# Patient Record
Sex: FEMALE | Race: BLACK OR AFRICAN AMERICAN
[De-identification: names, ages, dates, MRNs, and addresses within clinical notes are randomized per-mention and may not be internally consistent; named-entity substitution may affect disease eponyms.]

---

## 2018-03-03 ENCOUNTER — HOSPITAL ENCOUNTER (EMERGENCY)
Dept: HOSPITAL 72 - EMR | Age: 28
Discharge: HOME | End: 2018-03-03
Payer: COMMERCIAL

## 2018-03-03 VITALS — SYSTOLIC BLOOD PRESSURE: 122 MMHG | DIASTOLIC BLOOD PRESSURE: 78 MMHG

## 2018-03-03 VITALS — DIASTOLIC BLOOD PRESSURE: 78 MMHG | SYSTOLIC BLOOD PRESSURE: 122 MMHG

## 2018-03-03 VITALS — HEIGHT: 68 IN | WEIGHT: 180 LBS | BODY MASS INDEX: 27.28 KG/M2

## 2018-03-03 DIAGNOSIS — S46.911A: ICD-10-CM

## 2018-03-03 DIAGNOSIS — R06.00: ICD-10-CM

## 2018-03-03 DIAGNOSIS — S16.1XXA: Primary | ICD-10-CM
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
## 2018-03-03 DIAGNOSIS — M54.9: ICD-10-CM
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
## 2018-03-03 PROCEDURE — 71045 X-RAY EXAM CHEST 1 VIEW: CPT

## 2018-03-03 PROCEDURE — 99284 EMERGENCY DEPT VISIT MOD MDM: CPT

## 2018-03-03 PROCEDURE — 81025 URINE PREGNANCY TEST: CPT

## 2018-03-03 PROCEDURE — 72125 CT NECK SPINE W/O DYE: CPT

## 2018-03-03 PROCEDURE — 72128 CT CHEST SPINE W/O DYE: CPT

## 2018-03-03 NOTE — DIAGNOSTIC IMAGING REPORT
Indication: Back pain

 

Technique: Continuous helical transaxial imaging of the cervical and thoracic spine

was obtained from the lung bases to the pubic symphysis.  No IV contrast was

administered.  Coronal 2-D reformats were also obtained. Study obtained in a Siemens

sensation 64 slice CT. 

 

Total Dose length Product (DLP):  1252.51 mGycm

 

CT Dose Index Volume (CTDIvol):   30.52 mGy

 

 

Comparison: None

 

Findings: There is no fracture or malalignment identified. No soft tissue swelling

identified. Other osseous structures appear intact. The configuration of the

vertebral bodies, intervertebral discs appear normal.

 

IMPRESSION:

 

No acute injury. Negative cervical and thoracic spine CT.

 

[facility] has communicated the preliminary results to the Emergency

Department.  Their findings are largely concordant with this report. 

 

 

 

The CT scanner at [facility] is accredited by the [employer]

[employer] and the scans are performed using dose optimization techniques as

appropriate to a performed exam including Automatic Exposure control.

## 2018-03-03 NOTE — DIAGNOSTIC IMAGING REPORT
Indication: Back pain

 

Technique: Continuous helical transaxial imaging of the cervical and thoracic spine

was obtained from the lung bases to the pubic symphysis.  No IV contrast was

administered.  Coronal 2-D reformats were also obtained. Study obtained in a Siemens

sensation 64 slice CT. 

 

Total Dose length Product (DLP):  1252.51 mGycm

 

CT Dose Index Volume (CTDIvol):   30.52 mGy

 

 

Comparison: None

 

Findings: There is no fracture or malalignment identified. No soft tissue swelling

identified. Other osseous structures appear intact. The configuration of the

vertebral bodies, intervertebral discs appear normal.

 

IMPRESSION:

 

No acute injury. Negative cervical and thoracic spine CT.

 

[facility] has communicated the preliminary results to the Emergency

Department.  Their findings are largely concordant with this report. 

 

 

 

The CT scanner at [facility] is accredited by the [employer]

[employer] and the scans are performed using dose optimization techniques as

appropriate to a performed exam including Automatic Exposure control.
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
## 2018-09-07 ENCOUNTER — HOSPITAL ENCOUNTER (EMERGENCY)
Dept: HOSPITAL 72 - EMR | Age: 28
Discharge: HOME | End: 2018-09-07
Payer: COMMERCIAL

## 2018-09-07 VITALS — WEIGHT: 170 LBS | BODY MASS INDEX: 25.76 KG/M2 | HEIGHT: 68 IN

## 2018-09-07 VITALS — DIASTOLIC BLOOD PRESSURE: 70 MMHG | SYSTOLIC BLOOD PRESSURE: 110 MMHG

## 2018-09-07 VITALS — SYSTOLIC BLOOD PRESSURE: 118 MMHG | DIASTOLIC BLOOD PRESSURE: 78 MMHG

## 2018-09-07 DIAGNOSIS — R60.9: ICD-10-CM

## 2018-09-07 DIAGNOSIS — M79.605: Primary | ICD-10-CM

## 2018-09-07 PROCEDURE — 99284 EMERGENCY DEPT VISIT MOD MDM: CPT

## 2018-09-07 PROCEDURE — 93971 EXTREMITY STUDY: CPT

## 2018-09-07 NOTE — DIAGNOSTIC IMAGING REPORT
EXAM:

  US Duplex Left Lower Extremity Veins

 

CLINICAL HISTORY:

  PAIN

 

TECHNIQUE:

  Real-time duplex ultrasound scan of the left lower extremity veins 

integrating B-mode two-dimensional vascular structure, Doppler spectral 

analysis, color flow Doppler imaging and compression.

 

COMPARISON:

  No relevant prior studies available.

 

FINDINGS:

  Deep veins:  Unremarkable.  No DVT in the visualized common femoral, 

femoral, proximal deep femoral or popliteal veins.  The veins demonstrate 

normal color flow, are normally compressible, with normal phasic flow 

and/or augmentation response.

  Superficial veins:  Unremarkable.  No thrombus in the visualized great 

saphenous vein.

  Soft tissues:  No acute findings.  No popliteal cyst.

 

IMPRESSION:     

  No evidence for DVT in the visualized portions of the veins of the left 

lower extremity.

## 2018-09-07 NOTE — EMERGENCY ROOM REPORT
History of Present Illness


General


Chief Complaint:  Edema


Source:  Patient





Present Illness


HPI


This is a 28-year-old female with no past medical history.  She presents with 

chief complaint is left leg pain and swelling.  This been ongoing for almost 2 

weeks.  No trauma.  Pain is 7 out of 10.  Worse with palpation.  Worse with 

walking.  Better with rest.  No other complaint.  No family history of DVT.  

Not on birth control pill.


Allergies:  


Coded Allergies:  


     No Known Allergies (Unverified , 3/3/18)





Patient History


Past Medical History:  see triage record, old chart reviewed


Past Surgical History:  none


Pertinent Family History:  none


Social History:  Denies: smoking


Last Menstrual Period:  last month


Pregnant Now:  No


Immunizations:  other


Reviewed Nursing Documentation:  PMH: Agreed; PSxH: Agreed





Nursing Documentation-PMH


Past Medical History:  No Stated History





Review of Systems


Eye:  Denies: eye pain, blurred vision


ENT:  Denies: ear pain, nose congestion, throat swelling


Respiratory:  Denies: cough, shortness of breath


Cardiovascular:  Denies: chest pain, palpitations


Gastrointestinal:  Denies: abdominal pain, diarrhea, nausea, vomiting


Musculoskeletal:  Reports: muscle pain; Denies: back pain, joint pain


Skin:  Denies: rash


Neurological:  Denies: headache, numbness


Endocrine:  Denies: increased thirst, increased urine


Hematologic/Lymphatic:  Denies: easy bruising


All Other Systems:  negative except mentioned in HPI





Physical Exam





Vital Signs








  Date Time  Temp Pulse Resp B/P (MAP) Pulse Ox O2 Delivery O2 Flow Rate FiO2


 


9/7/18 22:13 97.7 96 18 118/78 96   





 97.7       





vitals normal


Sp02 EP Interpretation:  reviewed, normal


General Appearance:  well appearing, no apparent distress, alert


Head:  normocephalic, atraumatic


Eyes:  bilateral eye PERRL, bilateral eye EOMI


ENT:  hearing grossly normal, normal pharynx


Neck:  full range of motion, supple, no meningismus


Respiratory:  chest non-tender, lungs clear, normal breath sounds


Cardiovascular #1:  regular rate, rhythm, no murmur


Gastrointestinal:  normal bowel sounds, non tender, no mass, no organomegaly, 

no bruit, non-distended


Musculoskeletal:  back normal, gait/station normal, normal range of motion, 

other - Left leg: She has tenderness over the popliteal fossa.  There is also 

mild edema to the calf compared to the right side.  No bony tenderness.  Pulses 

normal.  Sensation normal.  No warmth or redness.


Neurologic:  alert, oriented x3


Psychiatric:  mood/affect normal


Skin:  warm/dry





Medical Decision Making


Diagnostic Impression:  


 Primary Impression:  


 Left leg pain


ER Course


Patient with left leg pain.  No evidence of any trauma.  No evidence of any 

fracture, DVT or baker cyst.  Most likely soft tissue injury or edema.  We'll 

discharge home.


CT/MRI/US Diagnostic Results


CT/MRI/US Diagnostic Results :  


   Imaging Test Ordered:  left leg ultrasound


   Impression


negative per ultrasonographer





Last Vital Signs








  Date Time  Temp Pulse Resp B/P (MAP) Pulse Ox O2 Delivery O2 Flow Rate FiO2


 


9/7/18 22:26 97.7  18 118/78 96   





 97.7       


 


9/7/18 22:13  96      








Status:  improved


Disposition:  HOME, SELF-CARE


Condition:  Stable


Scripts


Ibuprofen* (MOTRIN*) 600 Mg Tablet


600 MG ORAL THREE TIMES A DAY, #30 TAB 0 Refills


   Prov: ROBERTO CARLOS GLORIA M.D.         9/7/18





Additional Instructions:  


Follow-up with your doctor in 7 days.  Elevate leg.  Return if symptom worsen.











ROBERTO CARLOS GLORIA M.D. Sep 7, 2018 22:33

## 2018-11-01 ENCOUNTER — HOSPITAL ENCOUNTER (EMERGENCY)
Dept: HOSPITAL 72 - EMR | Age: 28
Discharge: HOME | End: 2018-11-01
Payer: COMMERCIAL

## 2018-11-01 VITALS — DIASTOLIC BLOOD PRESSURE: 76 MMHG | SYSTOLIC BLOOD PRESSURE: 120 MMHG

## 2018-11-01 VITALS — WEIGHT: 150 LBS | BODY MASS INDEX: 22.73 KG/M2 | HEIGHT: 68 IN

## 2018-11-01 VITALS — DIASTOLIC BLOOD PRESSURE: 89 MMHG | SYSTOLIC BLOOD PRESSURE: 113 MMHG

## 2018-11-01 VITALS — SYSTOLIC BLOOD PRESSURE: 113 MMHG | DIASTOLIC BLOOD PRESSURE: 89 MMHG
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
## 2018-11-01 DIAGNOSIS — S16.1XXA: Primary | ICD-10-CM
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
## 2018-11-01 PROCEDURE — 99283 EMERGENCY DEPT VISIT LOW MDM: CPT

## 2018-11-01 PROCEDURE — 96372 THER/PROPH/DIAG INJ SC/IM: CPT

## 2018-11-01 PROCEDURE — 72052 X-RAY EXAM NECK SPINE 6/>VWS: CPT
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
## 2018-11-02 NOTE — DIAGNOSTIC IMAGING REPORT
Indication: Neck Pain

 

Findings: 5 views of the cervical spine were obtained.

 

There is no acute fracture identified. Alignment is normal. The open-mouth odontoid

view shows an intact dens and good alignment of the lateral masses with respect to

the body of C2. There is no soft tissue swelling.

 

 

Impression: Negative cervical spine examination.

## 2019-01-23 ENCOUNTER — HOSPITAL ENCOUNTER (EMERGENCY)
Dept: HOSPITAL 72 - EMR | Age: 29
Discharge: HOME | End: 2019-01-23
Payer: COMMERCIAL

## 2019-01-23 VITALS — DIASTOLIC BLOOD PRESSURE: 74 MMHG | SYSTOLIC BLOOD PRESSURE: 128 MMHG

## 2019-01-23 VITALS — HEIGHT: 67 IN | BODY MASS INDEX: 25.11 KG/M2 | WEIGHT: 160 LBS
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
## 2019-01-23 DIAGNOSIS — S93.401A: Primary | ICD-10-CM

## 2019-01-23 PROCEDURE — 29515 APPLICATION SHORT LEG SPLINT: CPT

## 2019-01-23 PROCEDURE — 99283 EMERGENCY DEPT VISIT LOW MDM: CPT

## 2019-01-23 NOTE — NUR
ED Nurse Note:



air splint applied per ERMD order and crutches provided, pt returned 
demonstration properly, pt discharge instruction provided w/ prescription, pt 
wristband removed, pt education done via discussion and handout, pt advised to 
follow up with pcp or return to ed if s/s worsen or new s/s develop, pt 
verbalized understanding and agrees with plan, all belongings left with pt, pt 
states she will uber home.

## 2019-01-23 NOTE — DIAGNOSTIC IMAGING REPORT
Indication: Pain, trauma

 

Technique: 3 views of the right ankle

 

Comparison: none 

 

Findings: No acute fractures. No dislocations. The joint spaces are preserved

 

Impression: Negative

## 2019-01-23 NOTE — EMERGENCY ROOM REPORT
History of Present Illness


General


Chief Complaint:  Lower Extremity Injury


Source:  Patient





Present Illness


HPI


Is a 28-year-old female with no past medical history.  She present with chief 

complaint of right ankle pain.  Around 9 PM, she was walking down the steps and 

missed 2 steps and rolled her ankle.  She complaining of pain to the right 

ankle area.  Initially severe to bear weight but now unable to bear weight.  

Pain is 9 out of 10.  Worse with movement.  Worse with weightbearing.  No knee 

injury.  No other complaint.


Allergies:  


Coded Allergies:  


     No Known Allergies (Unverified , 3/3/18)





Patient History


Past Medical History:  none, see triage record, old chart reviewed


Past Surgical History:  none


Pertinent Family History:  none


Social History:  Denies: smoking


Last Menstrual Period:  dec 2018


Pregnant Now:  No


Immunizations:  other


Reviewed Nursing Documentation:  PMH: Agreed; PSxH: Agreed





Nursing Documentation-PMH


Past Medical History:  No Stated History





Review of Systems


Eye:  Denies: eye pain, blurred vision


ENT:  Denies: ear pain, nose congestion, throat swelling


Respiratory:  Denies: cough, shortness of breath


Cardiovascular:  Denies: chest pain, palpitations


Gastrointestinal:  Denies: abdominal pain, diarrhea, nausea, vomiting


Musculoskeletal:  Reports: joint pain, joint swelling; Denies: back pain


Skin:  Denies: rash


Neurological:  Denies: headache, numbness


Endocrine:  Denies: increased thirst, increased urine


Hematologic/Lymphatic:  Denies: easy bruising


All Other Systems:  negative except mentioned in HPI





Physical Exam





Vital Signs








  Date Time  Temp Pulse Resp B/P (MAP) Pulse Ox O2 Delivery O2 Flow Rate FiO2


 


1/23/19 01:11 98.6 84 16 122/77 96 Room Air  





vitals rod


Sp02 EP Interpretation:  reviewed, normal


General Appearance:  well appearing, no apparent distress, alert


Head:  normocephalic, atraumatic


Eyes:  bilateral eye PERRL, bilateral eye EOMI


ENT:  hearing grossly normal, normal pharynx


Neck:  full range of motion, supple, no meningismus


Respiratory:  chest non-tender, lungs clear, normal breath sounds


Cardiovascular #1:  regular rate, rhythm, no murmur


Gastrointestinal:  normal bowel sounds, non tender, no mass, no organomegaly, 

no bruit, non-distended


Musculoskeletal:  back normal, normal range of motion, tender - Right ankle: 

There is diffuse tenderness to the lateral malleolus.  Also tenderness to the 

base of the fifth metatarsal bone.  Ankle is stable.  Dorsalis pedis pulses 

normal.


Psychiatric:  mood/affect normal


Skin:  warm/dry





Procedures


Splinting


Splinting :  


   Consent:  Verbal


   Location:  rt ankle


   Pre-Made Type:  velcro


   Splint:  poserior short


   Pre-Proc Neuro Vasc Exam:  normal


   Post-Proc Neuro Vasc Exam:  normal


   Patient Tolerated:  Well


   Complications:  None





Medical Decision Making


Diagnostic Impression:  


 Primary Impression:  


 Moderate right ankle sprain


 Qualified Codes:  S93.401A - Sprain of unspecified ligament of right ankle, 

initial encounter


ER Course


Patient with ankle sprain.  No fracture or dislocation.  We'll discharge home.


Other X-Ray Diagnostic Results


Other X-Ray Diagnostic Results #1:  


   X-Ray ordered:  Right ankle x-rays


   # of Views/Limited Vs Complete:  3 View


   Indication:  Pain


   EP Interpretation:  Yes


   Interpretation:  no dislocation, no soft tissue swelling, no fractures


   Impression:  No acute disease


   Electronically Signed by:  Jaycob Dumont MD


Other X-Ray Diagnostic Results #2:  


   X-Ray ordered:  Rt foot


   # of Views/Limited Vs Complete:  3 View


   Indication:  Pain


   EP Interpretation:  Yes


   Interpretation:  no dislocation, no soft tissue swelling, no fractures


   Impression:  No acute disease


   Electronically Signed by:  Jaycob Dumont MD





Last Vital Signs








  Date Time  Temp Pulse Resp B/P (MAP) Pulse Ox O2 Delivery O2 Flow Rate FiO2


 


1/23/19 01:11 98.6 84 16 122/77 96 Room Air  








Status:  improved


Disposition:  HOME, SELF-CARE


Condition:  Stable


Scripts


Ibuprofen* (MOTRIN*) 600 Mg Tablet


600 MG ORAL THREE TIMES A DAY, #30 TAB 0 Refills


   Prov: Jaycob Dumont MD         1/23/19 


Hydrocodone/Acetaminophen 5-325* (HYDROCODONE/ACETAMINOPHEN 5-325*) 1 Each 

Tablet


1 TAB ORAL Q6H PRN for For Pain, #15 TAB 0 Refills


   Prov: Jaycob Dumont MD         1/23/19


Patient Instructions:  Ankle Sprain





Additional Instructions:  


Elevate ankle.  Ice pack to the area.  Use crutches as needed.  Wear splint.  

Follow-up with your doctor in 7 days.  May need a referral to see orthopedic 

doctor if not better.  Return if worse.











Jaycob Dumont MD Jan 23, 2019 01:19

## 2019-01-23 NOTE — NUR
ED Nurse Note:



pt came into ED c/o right ankle pain s/p fall, pt states a big dog was running 
at her and she fell down stairs and twisted her ankle. pt reports pain 10/10. 
noted swelling and tenderness on right ankle, cms intact on BLE, cap refill 
<3sec, +2 pedal pulses, no obvious deformity or contusion or skin wound noted. 
will cont monitor. ice pack provided for comfort with support.

## 2019-01-23 NOTE — DIAGNOSTIC IMAGING REPORT
Indication: Pain, trauma

 

Technique: 3 views right foot

 

Comparison: none

 

Findings: No acute fractures. No dislocations. The joint spaces are preserved.

 

Impression: Negative